# Patient Record
Sex: FEMALE | Race: WHITE | Employment: FULL TIME | URBAN - METROPOLITAN AREA
[De-identification: names, ages, dates, MRNs, and addresses within clinical notes are randomized per-mention and may not be internally consistent; named-entity substitution may affect disease eponyms.]

---

## 2017-01-03 ENCOUNTER — HOSPITAL ENCOUNTER (OUTPATIENT)
Dept: PHYSICAL THERAPY | Age: 52
Discharge: HOME | End: 2017-01-03
Payer: OTHER MISCELLANEOUS

## 2017-01-03 ENCOUNTER — APPOINTMENT (OUTPATIENT)
Dept: PHYSICAL THERAPY | Age: 52
End: 2017-01-03

## 2017-01-03 PROCEDURE — 97110 THERAPEUTIC EXERCISES: CPT

## 2017-01-03 PROCEDURE — 97140 MANUAL THERAPY 1/> REGIONS: CPT

## 2017-01-03 NOTE — PROGRESS NOTES
PHYSICAL THERAPY - DAILY TREATMENT NOTE    Patient Name: Griselda Hedges        Date: 1/3/2017  : 1965   YES Patient  Verified  Visit #:     12 ( 4)    of   6  Insurance: Payor: Vlad [employer] / Plan: [id number] / Product Type: Workers Comp /      In time: 5:00pm Out time: 6:10 pm   Total Treatment Time: 70     TREATMENT AREA =  Cervicalgia [M54.2]  Trapezius muscle spasm [M62.838]  Generalized headaches [R51]    SUBJECTIVE  Pain Level (on 0 to 10 scale):  2  / 10   Medication Changes/New allergies or changes in medical history, any new surgeries or procedures? NO    If yes, update Summary List   Subjective Functional Status/Changes:  []  No changes reported     \"The thing that was bothering me was my dangling arm. I noticed on my arm a cold sensation.        OBJECTIVE  Modalities Rationale:     decrease edema, decrease inflammation, decrease pain and increase tissue extensibility to improve patient's ability to close car door   min [] Estim, type/location:                                      []  att     []  unatt     []  w/US     []  w/ice    []  w/heat    min []  Mechanical Traction: type/lbs                   []  pro   []  sup   []  int   []  cont    []  before manual    []  after manual    min []  Ultrasound, settings/location:      min []  Iontophoresis w/ dexamethasone, location:                                               []  take home patch       []  in clinic   10 min [x]  Ice     [x]  Heat    location/position: C/S -MH, ice L shoulder supine    min []  Vasopneumatic Device, press/temp:     min []  Other:    [x] Skin assessment post-treatment (if applicable):    [x]  intact    []  redness- no adverse reaction     []redness - adverse reaction:        47 min Therapeutic Exercise:  [x]  See flow sheet   Rationale:      increase ROM and increase strength to improve the patients ability toclose car door     13 min Manual Therapy: Supine PROM L shoulder all planes, ERVIN INF, A/P gr 1-2, STM/DTM to L UT   Rationale:      decrease pain, increase ROM, increase tissue extensibility and decrease trigger points to improve patient's ability to close car door       min Patient Education:  YES  Reviewed HEP   []  Progressed/Changed HEP based on: Other Objective/Functional Measures: FOTO: 61  Updated written HEP. Review discharge instructions to continue HEP 3x per week x 4 weeks as tolerated. Pt education in slow progression into home walking program.   Post Treatment Pain Level (on 0 to 10) scale:   2  / 10     ASSESSMENT  Assessment/Changes in Function:     See discharge     [x]  See Progress Note/Recertification   Patient will continue to benefit from skilled PT services to modify and progress therapeutic interventions, address functional mobility deficits, address ROM deficits and address strength deficits to attain remaining goals. Progress toward goals / Updated goals:    See discharge     PLAN  []  Upgrade activities as tolerated NO Continue plan of care   [x]  Discharge due to : Met goals/progressing toward goals   []  Other:      Therapist: Alcides Phelps PTA    Date: 1/3/2017 Time: 6:10 PM     No future appointments.

## 2017-01-03 NOTE — PROGRESS NOTES
[facility] PHYSICAL THERAPY AT [facility] [address], [address], [place], [place] - Phone: [phone]  Fax: [phone]  Discharge Summary  Patient Name: Margarete Lesch : 1965   Treatment/Medical Diagnosis: Cervicalgia [M54.2]  Trapezius muscle spasm [M62.838]  Generalized headaches [R51]   Referral Source: Alyce Love MD     Date of Initial Visit: :18-16 Attended Visits: 12 Missed Visits: 1     SUMMARY OF TREATMENT  Physical Therapy Treatment has consisted of Therapeutic exercise for C/S ROM, isometric strengthening, L UE ROM, and gentle strengthening, Manual Therapy, Interferential electrical stimulation, HEP, moist heat/ice. CURRENT STATUS  Pt has made good  progress toward all LTGs for ROM, strength, and functional ability. . Pt reports ~ 85-90 overall improvement. Pt reports Pain is 3/10 . Functional deficits include  opening the car door, driving. Functional improvements: HA's & dizziness sx  have resolved, decreasing pain L UT, C/S AROM, L UE strength. AROM: Left shoulder flexion: 140 degreees; R sh flex: 146 degrees. Pt is independent with written HEP. Goal/Measure of Progress Goal Met? 1. Patient to be independent with HEP. Status at last Eval: na Current Status: Pt independent with written HEP. yes   2. Increase FOTO score to 56 indicating improved function and QOL. Status at last Eval: 44 Current Status: 61 yes   3. Improve C/S AROM in EXT, MABLE lat flexion, and L rot to 75% of normal to facilitate ADLs such as driving. Status at last Eval: Right Side bend: 35 degrees-pull L C/S Current Status: C/S AROM: ext/B SB: 90%, LRot: WNLs-no pain yes   4. Increased left shoulder supraspinatus strength to 4+/5 in order to improve ease with putting books away   Status at last Eval: 4-/5 Current Status: 4+/5  yes     RECOMMENDATIONS  Plan to discharge to home exercise program. Pt met goals/progressing toward goals.   If you have any questions/comments please contact us directly at  [phone]. Thank you for allowing us to assist in the care of your patient. LPTA Signature: Gino Guadalupe PTA  Date: 1/3/2017   PT Signature: Jonatan Hurd DPT Time: 5:09 PM   NOTE TO PHYSICIAN:  PLEASE COMPLETE THE ORDERS BELOW AND FAX TO   [facility]: [phone] If you are unable to process this request in 24 hours please contact our office:  [phone].    ___ I have read the above report and request that my patient continue as recommended.   ___ I have read the above report and request that my patient continue therapy with the following changes/special instructions:_________________________________________________________   ___ I have read the above report and request that my patient be discharged from therapy.      Physician Signature:        Date:       Time:

## 2017-01-05 ENCOUNTER — APPOINTMENT (OUTPATIENT)
Dept: PHYSICAL THERAPY | Age: 52
End: 2017-01-05

## 2017-01-05 ENCOUNTER — APPOINTMENT (OUTPATIENT)
Dept: PHYSICAL THERAPY | Age: 52
End: 2017-01-05
Payer: OTHER MISCELLANEOUS